# Patient Record
Sex: FEMALE | Race: WHITE
[De-identification: names, ages, dates, MRNs, and addresses within clinical notes are randomized per-mention and may not be internally consistent; named-entity substitution may affect disease eponyms.]

---

## 2019-07-25 ENCOUNTER — HOSPITAL ENCOUNTER (EMERGENCY)
Dept: HOSPITAL 10 - FTE | Age: 54
Discharge: HOME | End: 2019-07-25
Payer: COMMERCIAL

## 2019-07-25 ENCOUNTER — HOSPITAL ENCOUNTER (EMERGENCY)
Dept: HOSPITAL 91 - FTE | Age: 54
Discharge: HOME | End: 2019-07-25
Payer: COMMERCIAL

## 2019-07-25 VITALS
WEIGHT: 145.28 LBS | HEIGHT: 61 IN | HEIGHT: 61 IN | BODY MASS INDEX: 27.43 KG/M2 | BODY MASS INDEX: 27.43 KG/M2 | WEIGHT: 145.28 LBS

## 2019-07-25 VITALS — SYSTOLIC BLOOD PRESSURE: 115 MMHG | HEART RATE: 64 BPM | DIASTOLIC BLOOD PRESSURE: 64 MMHG | RESPIRATION RATE: 16 BRPM

## 2019-07-25 DIAGNOSIS — R51: Primary | ICD-10-CM

## 2019-07-25 LAB
ADD MAN DIFF?: NO
ALANINE AMINOTRANSFERASE: 9 IU/L (ref 13–69)
ALBUMIN/GLOBULIN RATIO: 1.07
ALBUMIN: 4.4 G/DL (ref 3.3–4.9)
ALKALINE PHOSPHATASE: 78 IU/L (ref 42–121)
ANION GAP: 8 (ref 5–13)
ASPARTATE AMINO TRANSFERASE: 19 IU/L (ref 15–46)
BASOPHIL #: 0 10^3/UL (ref 0–0.1)
BASOPHILS %: 0.3 % (ref 0–2)
BILIRUBIN,DIRECT: 0 MG/DL (ref 0–0.2)
BILIRUBIN,TOTAL: 1.2 MG/DL (ref 0.2–1.3)
BLOOD UREA NITROGEN: 15 MG/DL (ref 7–20)
CALCIUM: 9.5 MG/DL (ref 8.4–10.2)
CARBON DIOXIDE: 29 MMOL/L (ref 21–31)
CHLORIDE: 106 MMOL/L (ref 97–110)
CREATININE: 0.6 MG/DL (ref 0.44–1)
EOSINOPHILS #: 0.1 10^3/UL (ref 0–0.5)
EOSINOPHILS %: 0.8 % (ref 0–7)
GLOBULIN: 4.1 G/DL (ref 1.3–3.2)
GLUCOSE: 129 MG/DL (ref 70–220)
HEMATOCRIT: 42.6 % (ref 37–47)
HEMOGLOBIN: 13.3 G/DL (ref 12–16)
IMMATURE GRANS #M: 0.03 10^3/UL (ref 0–0.03)
IMMATURE GRANS % (M): 0.3 % (ref 0–0.43)
LYMPHOCYTES #: 1.4 10^3/UL (ref 0.8–2.9)
LYMPHOCYTES %: 15.4 % (ref 15–51)
MEAN CORPUSCULAR HEMOGLOBIN: 27.3 PG (ref 29–33)
MEAN CORPUSCULAR HGB CONC: 31.2 G/DL (ref 32–37)
MEAN CORPUSCULAR VOLUME: 87.5 FL (ref 82–101)
MEAN PLATELET VOLUME: 11.4 FL (ref 7.4–10.4)
MONOCYTE #: 0.5 10^3/UL (ref 0.3–0.9)
MONOCYTES %: 6.2 % (ref 0–11)
NEUTROPHIL #: 6.8 10^3/UL (ref 1.6–7.5)
NEUTROPHILS %: 77 % (ref 39–77)
NUCLEATED RED BLOOD CELLS #: 0 10^3/UL (ref 0–0)
NUCLEATED RED BLOOD CELLS%: 0 /100WBC (ref 0–0)
PLATELET COUNT: 191 10^3/UL (ref 140–415)
POTASSIUM: 3.8 MMOL/L (ref 3.5–5.1)
RED BLOOD COUNT: 4.87 10^6/UL (ref 4.2–5.4)
RED CELL DISTRIBUTION WIDTH: 13.5 % (ref 11.5–14.5)
SODIUM: 143 MMOL/L (ref 135–144)
TOTAL PROTEIN: 8.5 G/DL (ref 6.1–8.1)
WHITE BLOOD COUNT: 8.8 10^3/UL (ref 4.8–10.8)

## 2019-07-25 PROCEDURE — 70450 CT HEAD/BRAIN W/O DYE: CPT

## 2019-07-25 PROCEDURE — 85025 COMPLETE CBC W/AUTO DIFF WBC: CPT

## 2019-07-25 PROCEDURE — 96375 TX/PRO/DX INJ NEW DRUG ADDON: CPT

## 2019-07-25 PROCEDURE — 96374 THER/PROPH/DIAG INJ IV PUSH: CPT

## 2019-07-25 PROCEDURE — 80053 COMPREHEN METABOLIC PANEL: CPT

## 2019-07-25 PROCEDURE — 99285 EMERGENCY DEPT VISIT HI MDM: CPT

## 2019-07-25 RX ADMIN — ONDANSETRON HYDROCHLORIDE 1 MG: 2 INJECTION, SOLUTION INTRAMUSCULAR; INTRAVENOUS at 14:31

## 2019-07-25 RX ADMIN — METOCLOPRAMIDE HYDROCHLORIDE 1 MG: 10 INJECTION, SOLUTION INTRAMUSCULAR; INTRAVENOUS at 14:31

## 2019-07-25 RX ADMIN — THIAMINE HYDROCHLORIDE 1 MLS/HR: 100 INJECTION, SOLUTION INTRAMUSCULAR; INTRAVENOUS at 14:43

## 2019-07-25 RX ADMIN — DIPHENHYDRAMINE HYDROCHLORIDE 1 MG: 50 INJECTION, SOLUTION INTRAMUSCULAR; INTRAVENOUS at 14:34

## 2019-07-25 NOTE — ERD
ER Documentation


Chief Complaint


Chief Complaint





headache, n/v, and dizzines x 3 days





HPI


53-year-old female with complaint of headache, nausea vomiting, dizziness for 


the past 3 days.  States that the headache came on gradually and she has had a 


history of headaches but she has not had a headache of this intensity before. 


denies any treatments. denies sudden onset, fever, neck stiffness, rash, 


headache getting worse with change in position, headache initiated by exertion, 


HA worse in the morning, BOB waking up patient at night, new neurological 


deficits, numbness, weakness, vision problems, tenderness to palpation over 


temporal area, history of trauma, or possibility of CO2 poisoning.  In addition 


she states she has a history of Bell's palsy with right-sided facial droop.





ROS


All systems reviewed and are negative except as per history of present illness.





Medications


Home Meds


Active Scripts


Meclizine Hcl* (Antivert*) 12.5 Mg Tab, 25 MG PO Q6H PRN for DIZZINESS, #20 TAB


   Prov:PRIYANKA VENTURA         19


Ibuprofen* (Motrin*) 600 Mg Tab, 600 MG PO Q6, #30 TAB


   Prov:YARELISATABHAVANIPRIYANKA         19





Allergies


Allergies:  


Coded Allergies:  


     No Known Allergy (Unverified , 19)





PMhx/Soc


Medical and Surgical Hx:  pt denies Medical Hx, pt denies Surgical Hx


Hx Miscellaneous Medical Probl:  Yes (TUBAL LIGATION)


Hx Alcohol Use:  No


Hx Substance Use:  No


Hx Tobacco Use:  No


Smoking Status:  Never smoker





FmHx


Family History:  No diabetes, No coronary disease, No other





Physical Exam


Vitals





Vital Signs


  Date      Temp  Pulse  Resp  B/P (MAP)   Pulse Ox  O2          O2 Flow    FiO2


Time                                                 Delivery    Rate


   19  98.6     64    16      115/64        99  Room Air


     16:18                           (81)


   19  98.3     72    18      130/65        99


     13:30                           (86)





Physical Exam


Const:   No acute distress


Head:   Atraumatic 


Eyes:    Normal Conjunctiva


ENT:    Normal External Ears, Nose and Mouth.


Neck:               Full range of motion. No meningismus.


Resp:   Clear to auscultation bilaterally


Cardio:   Regular rate and rhythm, no murmurs


Abd:    Soft, non tender, non distended. Normal bowel sounds


Skin:   No petechiae or rashes


Back:   No midline or flank tenderness


Ext:    No cyanosis, or edema


Neur:   Awake and alert


Psych:    Normal Mood and Affect neuro:


 M/S:      Alert and oriented


 Face:      EOMI, face and pharynx with normal sensation and function


 Motor:    Right-sided facial droop noted, patient states this is chronic due to


Bell's palsy.


 Sensation:    Normal sensation throughout


 Speech:   Normal


 Cerebel:   Normal coordination


      Normal gait


      Normal finger to nose


 DTR:      2+ and symmetric upper/lower extremities


Result Diagram:  


19 1434                                                                    


           19 1438





Results 24 hrs





Laboratory Tests


       Test
                                 19
14:34  19
14:38


       White Blood Count                      8.8 10^3/ul


       Red Blood Count                       4.87 10^6/ul


       Hemoglobin                               13.3 g/dl


       Hematocrit                                  42.6 %


       Mean Corpuscular Volume                    87.5 fl


       Mean Corpuscular Hemoglobin                27.3 pg


       Mean Corpuscular Hemoglobin
Concent     31.2 g/dl 
  



       Red Cell Distribution Width                 13.5 %


       Platelet Count                         191 10^3/UL


       Mean Platelet Volume                       11.4 fl


       Immature Granulocytes %                    0.300 %


       Neutrophils %                               77.0 %


       Lymphocytes %                               15.4 %


       Monocytes %                                  6.2 %


       Eosinophils %                                0.8 %


       Basophils %                                  0.3 %


       Nucleated Red Blood Cells %            0.0 /100WBC


       Immature Granulocytes #              0.030 10^3/ul


       Neutrophils #                          6.8 10^3/ul


       Lymphocytes #                          1.4 10^3/ul


       Monocytes #                            0.5 10^3/ul


       Eosinophils #                          0.1 10^3/ul


       Basophils #                            0.0 10^3/ul


       Nucleated Red Blood Cells #            0.0 10^3/ul


       Sodium Level                                           143 mmol/L


       Potassium Level                                        3.8 mmol/L


       Chloride Level                                         106 mmol/L


       Carbon Dioxide Level                                    29 mmol/L


       Anion Gap                                                       8


       Blood Urea Nitrogen                                      15 mg/dl


       Creatinine                                             0.60 mg/dl


       Est Glomerular Filtrat Rate
mL/min   
               > 60 mL/min 



       Glucose Level                                           129 mg/dl


       Calcium Level                                           9.5 mg/dl


       Total Bilirubin                                         1.2 mg/dl


       Direct Bilirubin                                       0.00 mg/dl


       Indirect Bilirubin                                      1.2 mg/dl


       Aspartate Amino Transf
(AST/SGOT)    
                   19 IU/L 



       Alanine Aminotransferase
(ALT/SGPT)  
                    9 IU/L 



       Alkaline Phosphatase                                      78 IU/L


       Total Protein                                            8.5 g/dl


       Albumin                                                  4.4 g/dl


       Globulin                                                4.10 g/dl


       Albumin/Globulin Ratio                                       1.07





Current Medications


 Medications
   Dose
          Sig/Kenzie
       Start Time
   Status  Last


 (Trade)       Ordered        Route
 PRN     Stop Time              Admin
Dose


                              Reason                                Admin


                10 mg          ONCE  ONCE
    19       DC           19


Metoclopramid                 IV
            14:30
                       14:31



e HCl
                                       19 14:31


(Reglan)


 Ondansetron    4 mg           ONCE  STAT
    19       DC           19


HCl
  (Zofran                 IV
            14:21
                       14:31



Inj)                                         19 14:23


                25 mg          ONCE  STAT
    19       DC           19


Diphenhydrami                 IV
            14:21
                       14:34



ne
 HCl
                                     19 14:23


(Benadryl)


 Sodium         1,000 ml @ 
   Q1H STAT
      19       DC           19


Chloride       1,000 mls/hr   IV
            14:39
                       14:43



                                             19 15:38








Procedures/MDM


                            DIAGNOSTIC IMAGING REPORT





Patient: SHERINE GRACIA   : 1965   Age: 53  Sex: F                       





       MR #:    [id number]   Acct #:   [id number]    DOS: 19 1420


Ordering MD: PRIYANKA VENTURA    Location:  [facility]   Room/Bed:                    


                       


                                        


PROCEDURE:  CT brain without contrast


 


CLINICAL INDICATION:  Headaches 3 days 


 


TECHNIQUE:  CT of the brain without contrast was performed on a multidetector CT


scanner, with multiplanar reformats.  One or more of the following dose 


reduction techniques were used: Automated exposure control, adjustment in mA and


/ or kV according to patient size, use of iterative reconstructive technique.  


CTDIvol = 40 mGy; DLP = 634 mGy-cm. DICOM images are available.


 


COMPARISON:   None available 


 


FINDINGS:


No acute intracranial hemorrhage is identified.  No extra-axial fluid collection


is seen.


 


There is no mass effect.  No midline shift is identified.


 


The ventricles and sulci are within normal limits for size and configuration.  


 


A subtle punctate calcification is identified in the parasagittal left frontal 


region.  Gray-white junctions are preserved.  


 


Calvarium and skull base are intact.  Mastoid air cells and imaged paranasal 


sinuses grossly clear.   


 


 


IMPRESSION:


1.  No evidence of acute intracranial pathology.


2.  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 


sequela of neurocysticercosis.


 


 


RPTAT: VV


_____________________________________________ 


.Hernesto Moser MD, MD           Date    Time 


Electronically viewed and signed by .Hernesto Moser MD, MD on 2019 15:35 


 


D:  2019 15:35  T:  2019 15:35


.O/





CC: PRIYANKA VENTURA





[id number]





MDM: Patient's neuro exam compared to her baseline was within normal limits.  


Patient was made to do head CT given the different nature of patient's headache 


as well as her age.  Results within normal limits.  Pain resolved with reglan 


and fluids. Given normal neuro exam and patient's history of headaches and I 


think a lumbar puncture here at this time.  Patient emphatically denies sudden 


onset.  Patient did not appear to be in acute distress.  I have low suspicion 


for intracranial hemorrhage, elevated intracranial pressure, intracranial mass, 


aneurysm, meningitis, malignant hypertension, giant cell arteritis, carotid 


dissection,  intracranial abscess, cerebral venous thrombosis, CO2 poisoning, or


other emergent causes of headache based on patients history and exam





At this time, patient is stable for discharge and outpatient management. I have 


instructed the patient to follow-up with his/her primary care physician in 1-2 


days. I have discussed with the patient the possibility of needing to see a 


specialist for further workup and imaging studies if symptoms persist. I have 


instructed the patient to promptly return to the ER for any new or worsening 


symptoms including but not limited to increased pain, fever, nausea, vomiting, 


weakness or LOC. The patient and/or family expressed understanding of and 


agreement with this plan. All questions were answered. Home care instructions we


re provided. 





[Communication with patient both during the exam and instructions for discharge 


were performed with using a  .  Patient gave verbal confirmation to 


the practitioner, through the , that they understood everything that 


was being said to them.]








DISCLAIMER: 


Inadvertent spelling and grammatical errors are likely due to EHR/dictation 


software use and do not reflect on the overall quality of patient care. Also, 


please note that the electronic time recorded on this note does not necessarily 


reflect the actual time of the patient encounter.





Departure


Diagnosis:  


   Primary Impression:  


   Headache


Condition:  Stable











PRIYANKA VENTURA               2019 15:33